# Patient Record
(demographics unavailable — no encounter records)

---

## 2024-11-01 NOTE — HISTORY OF PRESENT ILLNESS
[de-identified] : First-time visit for this 52-year-old gentleman with a complaint of pain in the left knee that began 6 days ago.  Patient believes he may have injured himself walking down some steps and public transportation recalls no actual specific accident or injury initiating traumatic event but has been having anterior knee pain.  Patient denies any locking sometimes he has some difficulty fully extending the knee.  He states the pain is fairly severe at times he is here for first-time evaluation he went to a local urgent care where radiographs were taken and he was told there was no evidence of fracture but he did have some modest to moderate medial joint space narrowing of the affected left knee.  Patient was told to take ibuprofen 600 g 2-3 times a day he has not felt any sustained symptomatic improvement with this regimen.

## 2024-11-01 NOTE — REASON FOR VISIT
[Initial Visit] : an initial visit for [Knee Pain] : knee pain [FreeTextEntry2] : LEFT KNEE PAIN. STATES HE STARTED FEELING THE PAIN WHILE WALKING DOWN THE STAIRS IN THE TRAIN STATION ON 10/26/24.

## 2024-11-01 NOTE — PHYSICAL EXAM
[de-identified] : Left knee has some mild overall global swelling and some mild warmth compared to the right but no obvious effusion.  Quad tone is good patella seems to track well there is mild medial joint line tenderness also mild lateral joint line tenderness negative Marek's sign.  Range of motion 0 to 125 degrees knee stable to stress varus valgus was both full extension and 90 degrees of flexion. [de-identified] : Outside recent radiographs were available for review AP standing of the left knee shows moderate 50% cartilage loss of medial joint space.  No other abnormalities noted no acute fracture trauma or injury.

## 2024-11-01 NOTE — DISCUSSION/SUMMARY
[de-identified] : Patient I discussed the underlying potential etiology of his left knee pain.  Patient I reviewed the radiographs together he was able to appreciate a mild to moderate narrowing of medial space of the left knee on the standing AP projection.  I indicated the patient that this is not a acute radiographic changes and that he had this narrowing probably for a long time with no symptoms.  We reviewed the pertinent anatomy of the knee utilizing a model and a drawing I was able to show the patient how a torn meniscus can cause symptoms very similar to his complaints.  The plan for now will be placed on Celebrex 200 mg p.o. twice daily for 6-day course then to be taken thereafter as needed.  The reasonable risk and benefits of medication discussed in detail include potential side effects.  We also reviewed the patient's current medication profile and appears to be no relative current contraindication to his limited use.  Patient will also be sent for an MRI for does not see any sustained symptomatic improvement within 7 to 10 days of this regimen he was also told to liberally ice the knees for the next 10 days.  This consultation lasted 30 minutes.

## 2025-04-24 NOTE — PHYSICAL EXAM
[de-identified] : Left knee  Constitutional:  The patient is healthy-appearing and in no apparent distress.   Gait: The patient ambulates with a normal gait and no limp.  Cardiovascular System:  The capillary refill is less than 2 seconds.   Skin:  There are no skin abnormalities.  Left Knee:   Bony Palpation:  There is tenderness of the medial joint line.  There is no tenderness of the lateral joint line. There is tenderness of the medial femoral condyle. There is no tenderness of the lateral femoral condyle. There is no tenderness of the tibial tubercle. There is no tenderness of the superior patella. There is no tenderness of the inferior patella. There is no tenderness of the medial patellar facet. There is no tenderness of the lateral patellar facet.  Soft Tissue Palpation:  There is no tenderness of the medial retinaculum. There is no tenderness of the lateral retinaculum. There is no tenderness of the quadriceps tendon. There is no tenderness of the patella tendon. There is no tenderness of the ITB. There is no tenderness of the pes anserine.  Active Range of Motion:  The range of motion at the knee actively and passively is full.   Special Tests:  There is a negative Apley. There is a negative Steinmanns.  There is a negative Lachman and Anterior Drawer. There is a negative Posterior Drawer.   There is no varus or valgus laxity.  Strength:  There is 5/5 hip flexion and 5/5 knee flexion and extension.    Psychiatric:  The patient demonstrates a normal mood and affect and is active and alert [de-identified] : MRI left knee ([facility]):  There is a medial meniscal complex tear.  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

## 2025-04-24 NOTE — PHYSICAL EXAM
[de-identified] : Left knee  Constitutional:  The patient is healthy-appearing and in no apparent distress.   Gait: The patient ambulates with a normal gait and no limp.  Cardiovascular System:  The capillary refill is less than 2 seconds.   Skin:  There are no skin abnormalities.  Left Knee:   Bony Palpation:  There is tenderness of the medial joint line.  There is no tenderness of the lateral joint line. There is tenderness of the medial femoral condyle. There is no tenderness of the lateral femoral condyle. There is no tenderness of the tibial tubercle. There is no tenderness of the superior patella. There is no tenderness of the inferior patella. There is no tenderness of the medial patellar facet. There is no tenderness of the lateral patellar facet.  Soft Tissue Palpation:  There is no tenderness of the medial retinaculum. There is no tenderness of the lateral retinaculum. There is no tenderness of the quadriceps tendon. There is no tenderness of the patella tendon. There is no tenderness of the ITB. There is no tenderness of the pes anserine.  Active Range of Motion:  The range of motion at the knee actively and passively is full.   Special Tests:  There is a negative Apley. There is a negative Steinmanns.  There is a negative Lachman and Anterior Drawer. There is a negative Posterior Drawer.   There is no varus or valgus laxity.  Strength:  There is 5/5 hip flexion and 5/5 knee flexion and extension.    Psychiatric:  The patient demonstrates a normal mood and affect and is active and alert [de-identified] : MRI left knee ([facility]):  There is a medial meniscal complex tear.  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

## 2025-04-24 NOTE — HISTORY OF PRESENT ILLNESS
[de-identified] : Initial visit: Left knee pain  Reason: Meniscus tear  Duration: 2024 Prior studies: MRI @[facility] Symptoms: Sharp  Aggravating Fx: walking / up and down stairs  Alleviating Fx: Rest  Pain level: Mild Pain medication: Motrin  Surgical Hx: None Current Medication: Celecoxib  Allergies: NKA

## 2025-04-24 NOTE — HISTORY OF PRESENT ILLNESS
[de-identified] : Initial visit: Left knee pain  Reason: Meniscus tear  Duration: 2024 Prior studies: MRI @[facility] Symptoms: Sharp  Aggravating Fx: walking / up and down stairs  Alleviating Fx: Rest  Pain level: Mild Pain medication: Motrin  Surgical Hx: None Current Medication: Celecoxib  Allergies: NKA

## 2025-04-24 NOTE — ASSESSMENT
[FreeTextEntry1] : Discussed with patient at length symptoms and diagnosis.  Lengthy discussion with patient regarding nonoperative and operative risks and benefits as well as surgical expectations and postop protocols and expectations, including the possibility that surgery may fail to satisfactorily resolve patient's condition / symptoms.   Patient informed that radiologic imaging as well as physical exam are aids to helping determine treatment plans/recommendations and that intra-operative evaluation will be undertaken and any surgical treatment will take intra-operative evaluation into consideration to aid in improving the patient's symptoms/condition.  Patient aware of underlying arthritis and that surgery may not fully alleviate his symptoms.  Patient expresses understanding and patient's questions were answered.  Patient ELECTS to proceed with surgery.

## 2025-05-27 NOTE — HISTORY OF PRESENT ILLNESS
[de-identified] : Status post left knee arthroscopy with partial medial meniscectomy medial and patellofemoral chondroplasty and limited synovectomy [de-identified] : Patient is 4 days postop states overall he is feeling much improved but still has some soreness denies any paresthesias weightbearing as tolerated [de-identified] : On exam the left knee there is a minimal effusion wounds are clean dry intact with sutures removed and Steri-Stripped range of motion 0-1 35 no tenderness to the medial lateral joint line mild tenderness to medial lateral patella facet [de-identified] : Status post left knee arthroscopy with partial medial meniscectomy medial and patellofemoral chondroplasty and limited synovectomy [de-identified] : Reviewed at length with patient surgery meniscus severity and partial meniscectomy as well as underlying arthritis at this time he elects observation with home exercises offered PT but declined he will follow-up in 3 weeks